# Patient Record
Sex: MALE | Race: WHITE
[De-identification: names, ages, dates, MRNs, and addresses within clinical notes are randomized per-mention and may not be internally consistent; named-entity substitution may affect disease eponyms.]

---

## 2021-05-09 ENCOUNTER — HOSPITAL ENCOUNTER (EMERGENCY)
Dept: HOSPITAL 54 - ER | Age: 36
Discharge: HOME | End: 2021-05-09
Payer: SELF-PAY

## 2021-05-09 VITALS — HEIGHT: 75 IN | WEIGHT: 130 LBS | BODY MASS INDEX: 16.16 KG/M2

## 2021-05-09 VITALS — SYSTOLIC BLOOD PRESSURE: 111 MMHG | DIASTOLIC BLOOD PRESSURE: 63 MMHG

## 2021-05-09 DIAGNOSIS — F19.10: ICD-10-CM

## 2021-05-09 DIAGNOSIS — F10.129: ICD-10-CM
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
## 2021-05-09 DIAGNOSIS — F12.90: Primary | ICD-10-CM

## 2021-05-09 NOTE — NUR
PT  GETTING  UP AND  WONDERING  AROUND   BACK  BED  SOFT  RESTAINTS  APPLIED  
SO PATIENT  DOEN'T  FALLL,  CONT TO MONITOR

## 2021-05-09 NOTE — NUR
Patient does not wish to proceed with medical care recommended by  ( ). 
Patient given information related to possible complications, up to and 
including death, which could occur as a result of leaving the hospital at this 
time. Patient verbalizes understanding of risks involved due to leaving against 
medical advice. Patient has signed AMA form.